# Patient Record
Sex: MALE | URBAN - METROPOLITAN AREA
[De-identification: names, ages, dates, MRNs, and addresses within clinical notes are randomized per-mention and may not be internally consistent; named-entity substitution may affect disease eponyms.]

---

## 2022-05-16 ENCOUNTER — OFFICE VISIT (OUTPATIENT)
Dept: URBAN - METROPOLITAN AREA CLINIC 44 | Facility: CLINIC | Age: 72
End: 2022-05-16
Payer: MEDICARE

## 2022-05-16 DIAGNOSIS — H25.13 AGE-RELATED NUCLEAR CATARACT, BILATERAL: ICD-10-CM

## 2022-05-16 DIAGNOSIS — H40.1333 PIGMENTARY GLAUCOMA, BILATERAL, SEVERE STAGE: Primary | ICD-10-CM

## 2022-05-16 PROCEDURE — 92020 GONIOSCOPY: CPT | Performed by: OPHTHALMOLOGY

## 2022-05-16 PROCEDURE — 99204 OFFICE O/P NEW MOD 45 MIN: CPT | Performed by: OPHTHALMOLOGY

## 2022-05-16 PROCEDURE — 92083 EXTENDED VISUAL FIELD XM: CPT | Performed by: OPHTHALMOLOGY

## 2022-05-16 PROCEDURE — 76514 ECHO EXAM OF EYE THICKNESS: CPT | Performed by: OPHTHALMOLOGY

## 2022-05-16 PROCEDURE — 92133 CPTRZD OPH DX IMG PST SGM ON: CPT | Performed by: OPHTHALMOLOGY

## 2022-05-16 RX ORDER — LATANOPROST 50 UG/ML
0.005 % SOLUTION OPHTHALMIC
Qty: 7.5 | Refills: 1 | Status: ACTIVE
Start: 2022-05-16

## 2022-05-16 RX ORDER — TIMOLOL MALEATE 5 MG/ML
0.5 % SOLUTION/ DROPS OPHTHALMIC
Qty: 15 | Refills: 1 | Status: ACTIVE
Start: 2022-05-16

## 2022-05-16 RX ORDER — DORZOLAMIDE HCL 20 MG/ML
2 % SOLUTION/ DROPS OPHTHALMIC
Qty: 15 | Refills: 1 | Status: ACTIVE
Start: 2022-05-16

## 2022-05-16 ASSESSMENT — INTRAOCULAR PRESSURE
OS: 20
OD: 26

## 2022-05-16 ASSESSMENT — VISUAL ACUITY
OD: 20/20
OS: 20/25

## 2022-05-16 NOTE — IMPRESSION/PLAN
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 OS>OD Plan: Patient has severe pigmentary dispersion glaucoma OU. No previous visual fields available however likely needs lower IOP OU. Recommend lowering IOP with drops. May need additional treatment. Patient to begin using Dorzolamide BID OU(ERx'd as requested), but CONTINUE Timolol QAM OU and Latanoprost QHS OU. Emphasized and explained compliance. Poor compliance can lead to blindness. Call with any changes in vision, sudden onset of pain or new symptoms. 
RTC in 2 months for VFT 10-2 and DFE

## 2022-09-06 ENCOUNTER — OFFICE VISIT (OUTPATIENT)
Dept: URBAN - METROPOLITAN AREA CLINIC 44 | Facility: CLINIC | Age: 72
End: 2022-09-06
Payer: MEDICARE

## 2022-09-06 DIAGNOSIS — H40.1333 PIGMENTARY GLAUCOMA, BILATERAL, SEVERE STAGE: Primary | ICD-10-CM

## 2022-09-06 DIAGNOSIS — H25.13 AGE-RELATED NUCLEAR CATARACT, BILATERAL: ICD-10-CM

## 2022-09-06 PROCEDURE — 99213 OFFICE O/P EST LOW 20 MIN: CPT | Performed by: OPHTHALMOLOGY

## 2022-09-06 PROCEDURE — 92083 EXTENDED VISUAL FIELD XM: CPT | Performed by: OPHTHALMOLOGY

## 2022-09-06 ASSESSMENT — INTRAOCULAR PRESSURE
OS: 19
OD: 13
OS: 16
OD: 18

## 2022-09-06 NOTE — IMPRESSION/PLAN
Impression: Pigmentary glaucoma, bilateral, severe stage: T07.9293. OS>OD Plan: - IOP improved. Continue all medications as directed. - CONTINUE Dorzolamide BID OU, Timolol QAM OU and Latanoprost QHS OU. Emphasized and explained compliance. Poor compliance can lead to blindness. Call with any changes in vision, sudden onset of pain or new symptoms.
- RTC in 3 months for VFT 24-2 and IOP check with optometry.

## 2022-09-06 NOTE — IMPRESSION/PLAN
Impression: Age-related nuclear cataract, bilateral: H25.13. Plan: Patient is bothered by glare but not interested in surgery at this time. Recommend Phaco/KDB with Dr. Nhi Simpson when interested.

## 2022-12-12 ENCOUNTER — OFFICE VISIT (OUTPATIENT)
Dept: URBAN - METROPOLITAN AREA CLINIC 44 | Facility: CLINIC | Age: 72
End: 2022-12-12
Payer: MEDICARE

## 2022-12-12 DIAGNOSIS — H40.1333 PIGMENTARY GLAUCOMA, BILATERAL, SEVERE STAGE: Primary | ICD-10-CM

## 2022-12-12 PROCEDURE — 92083 EXTENDED VISUAL FIELD XM: CPT | Performed by: OPTOMETRIST

## 2022-12-12 PROCEDURE — 99204 OFFICE O/P NEW MOD 45 MIN: CPT | Performed by: OPTOMETRIST

## 2022-12-12 ASSESSMENT — INTRAOCULAR PRESSURE
OD: 21
OD: 16
OS: 23
OS: 16

## 2022-12-12 NOTE — IMPRESSION/PLAN
Impression: Pigmentary glaucoma, bilateral, severe stage: I74.6503. OS>OD Plan: IOP suboptimal despite good compliance with meds. VF stable OU. Continue current meds. Schedule Glaucoma Consult.